# Patient Record
Sex: FEMALE | URBAN - METROPOLITAN AREA
[De-identification: names, ages, dates, MRNs, and addresses within clinical notes are randomized per-mention and may not be internally consistent; named-entity substitution may affect disease eponyms.]

---

## 2022-08-01 ENCOUNTER — OFFICE VISIT (OUTPATIENT)
Dept: URBAN - METROPOLITAN AREA CLINIC 6 | Facility: CLINIC | Age: 52
End: 2022-08-01

## 2022-08-01 DIAGNOSIS — B02.9 ZOSTER WITHOUT COMPLICATION: Primary | ICD-10-CM

## 2022-08-01 PROCEDURE — 92002 INTRM OPH EXAM NEW PATIENT: CPT | Performed by: OPTOMETRIST

## 2022-08-01 ASSESSMENT — INTRAOCULAR PRESSURE
OS: 25
OD: 23

## 2022-08-01 NOTE — IMPRESSION/PLAN
Impression: Zoster without complication: V50.0. Plan: No signs of conjunctivitis or keratitis. Continue gabapentin, prednisone, valacyclovir as prescribed by PCP. Follow up Thursday.

## 2022-08-04 ENCOUNTER — OFFICE VISIT (OUTPATIENT)
Dept: URBAN - METROPOLITAN AREA CLINIC 6 | Facility: CLINIC | Age: 52
End: 2022-08-04

## 2022-08-04 PROCEDURE — 92012 INTRM OPH EXAM EST PATIENT: CPT | Performed by: OPTOMETRIST

## 2022-08-04 ASSESSMENT — INTRAOCULAR PRESSURE
OD: 17
OS: 16

## 2022-08-04 NOTE — IMPRESSION/PLAN
Impression: Zoster without complication: F47.9. Plan: No signs of conjunctivitis or keratitis. Continue gabapentin, prednisone, valacyclovir as prescribed by PCP. May return to care with primary care optometrist and PCP. RTC here PRN.